# Patient Record
Sex: FEMALE | Race: WHITE | Employment: UNEMPLOYED | URBAN - METROPOLITAN AREA
[De-identification: names, ages, dates, MRNs, and addresses within clinical notes are randomized per-mention and may not be internally consistent; named-entity substitution may affect disease eponyms.]

---

## 2021-11-01 ENCOUNTER — OFFICE VISIT (OUTPATIENT)
Dept: ORTHOPEDIC SURGERY | Age: 86
End: 2021-11-01
Payer: COMMERCIAL

## 2021-11-01 VITALS
DIASTOLIC BLOOD PRESSURE: 91 MMHG | HEART RATE: 66 BPM | WEIGHT: 117 LBS | RESPIRATION RATE: 14 BRPM | BODY MASS INDEX: 23.59 KG/M2 | SYSTOLIC BLOOD PRESSURE: 157 MMHG | HEIGHT: 59 IN

## 2021-11-01 DIAGNOSIS — M65.332 TRIGGER MIDDLE FINGER OF LEFT HAND: Primary | ICD-10-CM

## 2021-11-01 DIAGNOSIS — M79.642 PAIN OF LEFT HAND: Primary | ICD-10-CM

## 2021-11-01 PROCEDURE — 20550 NJX 1 TENDON SHEATH/LIGAMENT: CPT | Performed by: PHYSICIAN ASSISTANT

## 2021-11-01 PROCEDURE — 99203 OFFICE O/P NEW LOW 30 MIN: CPT | Performed by: PHYSICIAN ASSISTANT

## 2021-11-01 RX ORDER — PRASUGREL 10 MG/1
10 TABLET, FILM COATED ORAL DAILY
COMMUNITY

## 2021-11-01 RX ORDER — METHYLPREDNISOLONE ACETATE 80 MG/ML
80 INJECTION, SUSPENSION INTRA-ARTICULAR; INTRALESIONAL; INTRAMUSCULAR; SOFT TISSUE ONCE
Status: COMPLETED | OUTPATIENT
Start: 2021-11-01 | End: 2021-11-01

## 2021-11-01 RX ORDER — LIDOCAINE HYDROCHLORIDE 10 MG/ML
1 INJECTION, SOLUTION INFILTRATION; PERINEURAL ONCE
Status: COMPLETED | OUTPATIENT
Start: 2021-11-01 | End: 2021-11-01

## 2021-11-01 RX ORDER — ATORVASTATIN CALCIUM 40 MG/1
TABLET, FILM COATED ORAL
COMMUNITY
Start: 2021-10-11

## 2021-11-01 RX ORDER — LEVOTHYROXINE SODIUM 0.07 MG/1
75 TABLET ORAL DAILY
COMMUNITY

## 2021-11-01 RX ORDER — METOPROLOL SUCCINATE 25 MG/1
25 TABLET, EXTENDED RELEASE ORAL DAILY
COMMUNITY

## 2021-11-01 RX ORDER — COVID-19 ANTIGEN TEST
220 KIT MISCELLANEOUS PRN
COMMUNITY

## 2021-11-01 RX ORDER — ASPIRIN 81 MG/1
81 TABLET ORAL DAILY
COMMUNITY

## 2021-11-01 RX ADMIN — METHYLPREDNISOLONE ACETATE 80 MG: 80 INJECTION, SUSPENSION INTRA-ARTICULAR; INTRALESIONAL; INTRAMUSCULAR; SOFT TISSUE at 10:46

## 2021-11-01 RX ADMIN — LIDOCAINE HYDROCHLORIDE 1 ML: 10 INJECTION, SOLUTION INFILTRATION; PERINEURAL at 10:45

## 2021-11-01 NOTE — PROGRESS NOTES
[facility] [address] AND SPORTS MEDICINE  [address] [place] [address]  Dept: [phone]  Dept Fax: [phone]        Left Hand & Wrist   New Patient    Subjective:     Chief Complaint   Patient presents with    New Patient     Left Hand Middle Finger Pain & Locking     HPI:     Jose Daniels presents with pain in her left middle digit. She states that on 10/24/2021 her left middle digit became stuck in a flexed position. She states it was extremely painful and was stuck that way for 4 days. She tried heat and ice with no relief. Eventually the finger became unstuck and is felt better. Today she states the pain is not terrible. She states this is happened to her in the past but it has never been stuck for this long. She has tried Tylenol extra strength and naproxen. She has seen her family doctor for this condition who referred her to orthopedics. She does have a history of arthritis in her fingers. She states that her fingers have been \" stuck this way\" for 15 years, but this pain was different. She has not had a cortisone injection. She has not had any physical therapy. She denies any numbness or tingling in her hand or fingers. ROS:     Review of Systems   Constitutional: Positive for activity change. Negative for appetite change, fatigue and fever. Respiratory: Negative. Negative for apnea, cough, chest tightness and shortness of breath. Cardiovascular: Negative. Negative for chest pain, palpitations and leg swelling. Gastrointestinal: Negative for abdominal distention, abdominal pain, constipation, diarrhea, nausea and vomiting. Genitourinary: Negative for difficulty urinating, dysuria and hematuria. Musculoskeletal: Positive for arthralgias. Negative for gait problem, joint swelling and myalgias. Skin: Negative for color change and rash. Neurological: Negative for dizziness, weakness, numbness and headaches. Attends [employer] Services:     Active Member of Clubs or Organizations:     Attends Club or Organization Meetings:     Marital Status:    Intimate Partner Violence:     Fear of Current or Ex-Partner:     Emotionally Abused:     Physically Abused:     Sexually Abused:        Family History:  No family history on file. Vitals:   BP (!) 157/91   Pulse 66   Resp 14   Ht 4' 11\" (1.499 m)   Wt 117 lb (53.1 kg)   BMI 23.63 kg/m²  Body mass index is 23.63 kg/m². Physical Examination:     Orthopedics    GENERAL: Alert and oriented X3 in no acute distress. SKIN: Visual inspection reveals no soft tissue swelling or nessa abnormality, no rotational deformity, Intact without lesions or ulcerations. NEURO: Radial, Ulnar and Median nerves are intact to sensory and motor testing. VASC: Capillary refill is less than 3 seconds, no signs of thoracic outlet syndrome, negative Jus's test.    Hand & Wrist Exam    LOCATION: Left Hand & Wrist  MUSC: Good strength is available in these motions. WRIST: No pain to palpation. No nessa pain or instability to palpation. WRIST TESTS: Negative finkelstein's, Negative Tinel's test, Negative Phalen's, Negative Stephanie Compression  ROM: Full flexor and extensor tendon function is intact. Limited range of motion of the middle, ring and little finger on the left hand. Obvious deformity of the DIP joints of the middle and ring finger. PALPATION: pain over the nodule on the ventral surface just below the [facility] joint of the middle finger. Assessment:     1. Trigger middle finger of left hand      Procedures:    Procedure: yes    TRIGGER FINGER INJECTION    Location: left Middle Finger   The stenosing portion of the flexor tendon at the A-1 pulley was identified volarly and marked distal to the pulley with the hollow end of a click type ball-point pen. The skin was prepped with a Betadine swab in a sterile fashion.   The flexor tendon sheath was injected under sterile technique with a 2 cc solution containing 1cc of 1% lidocaine  and 1 cc of 80 mg Depo-Medrol with fluid traveling up the tendon sheath. Injection site was verified by flexing and extending the digit and sensing the needle rubbing on the flexor tendon. The skin was cleansed and a Band-Aid was placed. The patient tolerated the procedure without difficulty. The patient was told to watch for signs of infection and to call immediately with any problems. Radiology:   XR HAND LEFT (MIN 3 VIEWS)    Result Date: 11/1/2021  3 views of the left hand including AP, lateral and oblique reveal degenerative changes throughout all fingers with the worst being the DIP joints of the index and middle finger. Significant carpometacarpal joint degenerative changes of the thumb noted. Calcific tendinitis of the middle and ring digit noted at the carpometacarpal joint. Impression: Moderate to severe degenerative changes of the left hand  Plan:   Treatment : I reviewed the X-ray with the patient and I informed them that the x-ray shows some calcific tendinitis of the flexor tendon of the middle digit. We discussed the etiologies and natural histories of trigger finger of the left middle finger. We discussed the various treatment alternatives including anti-inflammatory medications, physical therapy, injections, further imaging studies and as a last result surgery. During today's visit, we discussed that there are options for surgery versus conservative treatments like a cortisone injection. The patient and her daughter state that they don't want to have surgery unless absolutely necessary. I did suggest she could try Voltaren gel over-the-counter to see if that helps but the patient states she wants to have a cortisone injection to try to keep her finger from getting stuck again. I explained that it will not fix the problem but it may keep it from triggering and she would like to try that.   I did offer her to have her come back and meet  Tommy to discuss a trigger finger release if the injection doesn't work. She stated that she does all of her orthopedic treatments at [facility] but may consider meeting with Dr. Fany Nathan if she decides to have surgery. The patient has opted for a cortisone injection into the left middle trigger finger to help reduce inflammation and pain. The injection site should never get red, hot, or swollen and if it does the patient will contact our office right away. The patient may experience a increase in soreness the first 24-48 hours due to a cortisone flair and can take anti-inflammatories for a short period of time to reduce that soreness. The patient should not submerge the injection site in water for a minimum of 24 hours to avoid infection. This means no lakes, pools, ponds, or hot tubs for 24 hours. If the patient is diabetic the injection may increase their blood sugar for up to one week. The patient can do this cortisone injection once every 3 months as needed. If the injections stop working and do not give the patient relief the patient should consider surgical interventions to produce long term relief. Patient should return to the clinic as needed to follow up with  Carlos Castillo PA-C. The patient will call the office immediately with any problems. Orders Placed This Encounter   Medications    lidocaine 1 % injection 1 mL    methylPREDNISolone acetate (DEPO-MEDROL) injection 80 mg       No orders of the defined types were placed in this encounter. This note is created with the assistance of a speech recognition program.  While intending to generate a document that actually reflects the content of the visit, the document can still have some errors including those of syntax and sound a like substitutions which may escape proof reading.   In such instances, actual meaning can be extrapolated by contextual diversion      Electronically signed by Jeremy Rangel PA-C, on 11/2/2021 at 5:03 PM

## 2021-11-02 ASSESSMENT — ENCOUNTER SYMPTOMS
ABDOMINAL PAIN: 0
VOMITING: 0
SHORTNESS OF BREATH: 0
DIARRHEA: 0
CHEST TIGHTNESS: 0
NAUSEA: 0
CONSTIPATION: 0
RESPIRATORY NEGATIVE: 1
COUGH: 0
COLOR CHANGE: 0
ABDOMINAL DISTENTION: 0
APNEA: 0

## 2022-11-10 ENCOUNTER — TRANSCRIBE ORDERS (OUTPATIENT)
Dept: ADMINISTRATIVE | Age: 87
End: 2022-11-10

## 2022-11-10 DIAGNOSIS — I65.23 CAROTID STENOSIS, BILATERAL: Primary | ICD-10-CM

## 2022-12-02 ENCOUNTER — HOSPITAL ENCOUNTER (OUTPATIENT)
Dept: VASCULAR LAB | Age: 87
Discharge: HOME | End: 2022-12-02
Payer: COMMERCIAL

## 2022-12-02 DIAGNOSIS — I65.23 CAROTID STENOSIS, BILATERAL: ICD-10-CM

## 2022-12-02 PROCEDURE — 93880 EXTRACRANIAL BILAT STUDY: CPT

## 2024-04-19 ENCOUNTER — HOSPITAL ENCOUNTER (INPATIENT)
Age: 89
LOS: 3 days | Discharge: HOME | DRG: 309 | End: 2024-04-22
Attending: EMERGENCY MEDICINE | Admitting: STUDENT IN AN ORGANIZED HEALTH CARE EDUCATION/TRAINING PROGRAM
Payer: COMMERCIAL

## 2024-04-19 ENCOUNTER — APPOINTMENT (OUTPATIENT)
Dept: CT IMAGING | Age: 89
DRG: 309 | End: 2024-04-19
Payer: COMMERCIAL

## 2024-04-19 DIAGNOSIS — I48.3 TYPICAL ATRIAL FLUTTER (HCC): Primary | ICD-10-CM

## 2024-04-19 DIAGNOSIS — N30.00 ACUTE CYSTITIS WITHOUT HEMATURIA: ICD-10-CM

## 2024-04-19 DIAGNOSIS — I48.92 ATRIAL FLUTTER, UNSPECIFIED TYPE (HCC): ICD-10-CM

## 2024-04-19 LAB
ANION GAP SERPL CALCULATED.3IONS-SCNC: 13 MMOL/L (ref 9–17)
BACTERIA URNS QL MICRO: ABNORMAL
BASOPHILS # BLD: 0.01 K/UL (ref 0–0.2)
BASOPHILS NFR BLD: 0 % (ref 0–2)
BILIRUB UR QL STRIP: NEGATIVE
BNP SERPL-MCNC: 4454 PG/ML
BUN SERPL-MCNC: 41 MG/DL (ref 8–23)
CALCIUM SERPL-MCNC: 9.1 MG/DL (ref 8.6–10.4)
CASTS #/AREA URNS LPF: ABNORMAL /LPF
CASTS #/AREA URNS LPF: ABNORMAL /LPF
CHLORIDE SERPL-SCNC: 106 MMOL/L (ref 98–107)
CLARITY UR: ABNORMAL
CO2 SERPL-SCNC: 21 MMOL/L (ref 20–31)
COLOR UR: YELLOW
CREAT SERPL-MCNC: 1 MG/DL (ref 0.5–0.9)
EKG Q-T INTERVAL: 380 MS
EKG QRS DURATION: 80 MS
EKG QTC CALCULATION (BAZETT): 427 MS
EKG R AXIS: -22 DEGREES
EKG T AXIS: 20 DEGREES
EKG VENTRICULAR RATE: 76 BPM
EOSINOPHIL # BLD: 0.08 K/UL (ref 0–0.4)
EOSINOPHILS RELATIVE PERCENT: 2 % (ref 1–4)
EPI CELLS #/AREA URNS HPF: ABNORMAL /HPF (ref 0–5)
ERYTHROCYTE [DISTWIDTH] IN BLOOD BY AUTOMATED COUNT: 13.8 % (ref 12.5–15.4)
ERYTHROCYTE [DISTWIDTH] IN BLOOD BY AUTOMATED COUNT: 14.3 % (ref 12.5–15.4)
GFR SERPL CREATININE-BSD FRML MDRD: 53 ML/MIN/1.73M2
GLUCOSE SERPL-MCNC: 99 MG/DL (ref 70–99)
GLUCOSE UR STRIP-MCNC: NEGATIVE MG/DL
HCT VFR BLD AUTO: 31.5 % (ref 36–46)
HCT VFR BLD AUTO: 34.8 % (ref 36–46)
HGB BLD-MCNC: 10.7 G/DL (ref 12–16)
HGB BLD-MCNC: 11.3 G/DL (ref 12–16)
HGB UR QL STRIP.AUTO: NEGATIVE
INR PPP: 0.9
KETONES UR STRIP-MCNC: NEGATIVE MG/DL
LEUKOCYTE ESTERASE UR QL STRIP: ABNORMAL
LYMPHOCYTES NFR BLD: 1.42 K/UL (ref 1–4.8)
LYMPHOCYTES RELATIVE PERCENT: 28 % (ref 24–44)
MAGNESIUM SERPL-MCNC: 2.1 MG/DL (ref 1.6–2.6)
MCH RBC QN AUTO: 31.8 PG (ref 26–34)
MCH RBC QN AUTO: 31.9 PG (ref 26–34)
MCHC RBC AUTO-ENTMCNC: 32.5 G/DL (ref 31–37)
MCHC RBC AUTO-ENTMCNC: 34.2 G/DL (ref 31–37)
MCV RBC AUTO: 93.3 FL (ref 80–100)
MCV RBC AUTO: 98 FL (ref 80–100)
MONOCYTES NFR BLD: 0.76 K/UL (ref 0.1–1.2)
MONOCYTES NFR BLD: 15 % (ref 2–11)
MUCOUS THREADS URNS QL MICRO: ABNORMAL
NEUTROPHILS NFR BLD: 55 % (ref 36–66)
NEUTS SEG NFR BLD: 2.81 K/UL (ref 1.8–7.7)
NITRITE UR QL STRIP: NEGATIVE
PARTIAL THROMBOPLASTIN TIME: 23.3 SEC (ref 21.3–31.3)
PARTIAL THROMBOPLASTIN TIME: 23.4 SEC (ref 21.3–31.3)
PH UR STRIP: 6 [PH] (ref 5–8)
PLATELET # BLD AUTO: 253 K/UL (ref 140–450)
PLATELET # BLD AUTO: 264 K/UL (ref 140–450)
PMV BLD AUTO: 7 FL (ref 6–12)
PMV BLD AUTO: 9.3 FL (ref 8–14)
POTASSIUM SERPL-SCNC: 4 MMOL/L (ref 3.7–5.3)
PROT UR STRIP-MCNC: ABNORMAL MG/DL
PROTHROMBIN TIME: 10 SEC (ref 9.4–12.6)
RBC # BLD AUTO: 3.37 M/UL (ref 4–5.2)
RBC # BLD AUTO: 3.55 M/UL (ref 4–5.2)
RBC #/AREA URNS HPF: ABNORMAL /HPF (ref 0–2)
SODIUM SERPL-SCNC: 140 MMOL/L (ref 135–144)
SP GR UR STRIP: 1.02 (ref 1–1.03)
TROPONIN I SERPL HS-MCNC: 20 NG/L (ref 0–14)
TROPONIN I SERPL HS-MCNC: 21 NG/L (ref 0–14)
TSH SERPL DL<=0.05 MIU/L-ACNC: 3.23 UIU/ML (ref 0.3–5)
UROBILINOGEN UR STRIP-ACNC: NORMAL EU/DL (ref 0–1)
WBC #/AREA URNS HPF: ABNORMAL /HPF (ref 0–5)
WBC OTHER # BLD: 4.6 K/UL (ref 3.5–11)
WBC OTHER # BLD: 5.1 K/UL (ref 3.5–11)

## 2024-04-19 PROCEDURE — 36415 COLL VENOUS BLD VENIPUNCTURE: CPT

## 2024-04-19 PROCEDURE — 96365 THER/PROPH/DIAG IV INF INIT: CPT

## 2024-04-19 PROCEDURE — 2580000003 HC RX 258: Performed by: NURSE PRACTITIONER

## 2024-04-19 PROCEDURE — 1210000000 HC MED SURG R&B

## 2024-04-19 PROCEDURE — 85025 COMPLETE CBC W/AUTO DIFF WBC: CPT

## 2024-04-19 PROCEDURE — 83735 ASSAY OF MAGNESIUM: CPT

## 2024-04-19 PROCEDURE — 85027 COMPLETE CBC AUTOMATED: CPT

## 2024-04-19 PROCEDURE — 93005 ELECTROCARDIOGRAM TRACING: CPT | Performed by: EMERGENCY MEDICINE

## 2024-04-19 PROCEDURE — 87077 CULTURE AEROBIC IDENTIFY: CPT

## 2024-04-19 PROCEDURE — 6360000002 HC RX W HCPCS: Performed by: EMERGENCY MEDICINE

## 2024-04-19 PROCEDURE — 87088 URINE BACTERIA CULTURE: CPT

## 2024-04-19 PROCEDURE — 99285 EMERGENCY DEPT VISIT HI MDM: CPT

## 2024-04-19 PROCEDURE — 81001 URINALYSIS AUTO W/SCOPE: CPT

## 2024-04-19 PROCEDURE — 6360000004 HC RX CONTRAST MEDICATION: Performed by: EMERGENCY MEDICINE

## 2024-04-19 PROCEDURE — 87186 SC STD MICRODIL/AGAR DIL: CPT

## 2024-04-19 PROCEDURE — 83880 ASSAY OF NATRIURETIC PEPTIDE: CPT

## 2024-04-19 PROCEDURE — 6370000000 HC RX 637 (ALT 250 FOR IP): Performed by: NURSE PRACTITIONER

## 2024-04-19 PROCEDURE — 2580000003 HC RX 258: Performed by: EMERGENCY MEDICINE

## 2024-04-19 PROCEDURE — 85730 THROMBOPLASTIN TIME PARTIAL: CPT

## 2024-04-19 PROCEDURE — 85610 PROTHROMBIN TIME: CPT

## 2024-04-19 PROCEDURE — 84484 ASSAY OF TROPONIN QUANT: CPT

## 2024-04-19 PROCEDURE — 87086 URINE CULTURE/COLONY COUNT: CPT

## 2024-04-19 PROCEDURE — 71260 CT THORAX DX C+: CPT

## 2024-04-19 PROCEDURE — 80048 BASIC METABOLIC PNL TOTAL CA: CPT

## 2024-04-19 PROCEDURE — 84443 ASSAY THYROID STIM HORMONE: CPT

## 2024-04-19 PROCEDURE — 6360000002 HC RX W HCPCS: Performed by: NURSE PRACTITIONER

## 2024-04-19 RX ORDER — ENOXAPARIN SODIUM 100 MG/ML
30 INJECTION SUBCUTANEOUS DAILY
Status: CANCELLED | OUTPATIENT
Start: 2024-04-19

## 2024-04-19 RX ORDER — HEPARIN SODIUM 1000 [USP'U]/ML
30 INJECTION, SOLUTION INTRAVENOUS; SUBCUTANEOUS PRN
Status: DISCONTINUED | OUTPATIENT
Start: 2024-04-19 | End: 2024-04-22 | Stop reason: HOSPADM

## 2024-04-19 RX ORDER — ACETAMINOPHEN 325 MG/1
650 TABLET ORAL EVERY 6 HOURS PRN
Status: DISCONTINUED | OUTPATIENT
Start: 2024-04-19 | End: 2024-04-22 | Stop reason: HOSPADM

## 2024-04-19 RX ORDER — ATORVASTATIN CALCIUM 40 MG/1
40 TABLET, FILM COATED ORAL DAILY
Status: DISCONTINUED | OUTPATIENT
Start: 2024-04-19 | End: 2024-04-22 | Stop reason: HOSPADM

## 2024-04-19 RX ORDER — ACETAMINOPHEN 650 MG/1
650 SUPPOSITORY RECTAL EVERY 6 HOURS PRN
Status: DISCONTINUED | OUTPATIENT
Start: 2024-04-19 | End: 2024-04-22 | Stop reason: HOSPADM

## 2024-04-19 RX ORDER — ONDANSETRON 2 MG/ML
4 INJECTION INTRAMUSCULAR; INTRAVENOUS EVERY 6 HOURS PRN
Status: DISCONTINUED | OUTPATIENT
Start: 2024-04-19 | End: 2024-04-22 | Stop reason: HOSPADM

## 2024-04-19 RX ORDER — HEPARIN SODIUM 1000 [USP'U]/ML
60 INJECTION, SOLUTION INTRAVENOUS; SUBCUTANEOUS PRN
Status: DISCONTINUED | OUTPATIENT
Start: 2024-04-19 | End: 2024-04-22 | Stop reason: HOSPADM

## 2024-04-19 RX ORDER — HEPARIN SODIUM 1000 [USP'U]/ML
60 INJECTION, SOLUTION INTRAVENOUS; SUBCUTANEOUS ONCE
Status: COMPLETED | OUTPATIENT
Start: 2024-04-19 | End: 2024-04-19

## 2024-04-19 RX ORDER — SODIUM CHLORIDE 0.9 % (FLUSH) 0.9 %
5-40 SYRINGE (ML) INJECTION EVERY 12 HOURS SCHEDULED
Status: DISCONTINUED | OUTPATIENT
Start: 2024-04-19 | End: 2024-04-22 | Stop reason: HOSPADM

## 2024-04-19 RX ORDER — LEVOTHYROXINE SODIUM 0.07 MG/1
75 TABLET ORAL DAILY
Status: DISCONTINUED | OUTPATIENT
Start: 2024-04-19 | End: 2024-04-22 | Stop reason: HOSPADM

## 2024-04-19 RX ORDER — HEPARIN SODIUM 10000 [USP'U]/100ML
5-30 INJECTION, SOLUTION INTRAVENOUS CONTINUOUS
Status: DISCONTINUED | OUTPATIENT
Start: 2024-04-19 | End: 2024-04-22

## 2024-04-19 RX ORDER — 0.9 % SODIUM CHLORIDE 0.9 %
80 INTRAVENOUS SOLUTION INTRAVENOUS ONCE
Status: COMPLETED | OUTPATIENT
Start: 2024-04-19 | End: 2024-04-19

## 2024-04-19 RX ORDER — ENOXAPARIN SODIUM 100 MG/ML
1 INJECTION SUBCUTANEOUS 2 TIMES DAILY
Status: DISCONTINUED | OUTPATIENT
Start: 2024-04-19 | End: 2024-04-19

## 2024-04-19 RX ORDER — SODIUM CHLORIDE 0.9 % (FLUSH) 0.9 %
10 SYRINGE (ML) INJECTION PRN
Status: DISCONTINUED | OUTPATIENT
Start: 2024-04-19 | End: 2024-04-22 | Stop reason: HOSPADM

## 2024-04-19 RX ORDER — PRASUGREL 10 MG/1
10 TABLET, FILM COATED ORAL DAILY
Status: DISCONTINUED | OUTPATIENT
Start: 2024-04-19 | End: 2024-04-20

## 2024-04-19 RX ORDER — ASPIRIN 81 MG/1
81 TABLET ORAL DAILY
Status: DISCONTINUED | OUTPATIENT
Start: 2024-04-19 | End: 2024-04-22 | Stop reason: HOSPADM

## 2024-04-19 RX ORDER — ONDANSETRON 4 MG/1
4 TABLET, ORALLY DISINTEGRATING ORAL EVERY 8 HOURS PRN
Status: DISCONTINUED | OUTPATIENT
Start: 2024-04-19 | End: 2024-04-22 | Stop reason: HOSPADM

## 2024-04-19 RX ORDER — SODIUM CHLORIDE 9 MG/ML
INJECTION, SOLUTION INTRAVENOUS PRN
Status: DISCONTINUED | OUTPATIENT
Start: 2024-04-19 | End: 2024-04-22 | Stop reason: HOSPADM

## 2024-04-19 RX ORDER — POLYETHYLENE GLYCOL 3350 17 G/17G
17 POWDER, FOR SOLUTION ORAL DAILY PRN
Status: DISCONTINUED | OUTPATIENT
Start: 2024-04-19 | End: 2024-04-22 | Stop reason: HOSPADM

## 2024-04-19 RX ORDER — METOPROLOL SUCCINATE 25 MG/1
25 TABLET, EXTENDED RELEASE ORAL DAILY
Status: DISCONTINUED | OUTPATIENT
Start: 2024-04-19 | End: 2024-04-22 | Stop reason: HOSPADM

## 2024-04-19 RX ADMIN — ACETAMINOPHEN 650 MG: 325 TABLET ORAL at 22:51

## 2024-04-19 RX ADMIN — SODIUM CHLORIDE, PRESERVATIVE FREE 10 ML: 5 INJECTION INTRAVENOUS at 22:39

## 2024-04-19 RX ADMIN — HEPARIN SODIUM 12 UNITS/KG/HR: 10000 INJECTION, SOLUTION INTRAVENOUS at 22:50

## 2024-04-19 RX ADMIN — SODIUM CHLORIDE 80 ML: 9 INJECTION, SOLUTION INTRAVENOUS at 18:07

## 2024-04-19 RX ADMIN — HEPARIN SODIUM 2900 UNITS: 1000 INJECTION INTRAVENOUS; SUBCUTANEOUS at 22:48

## 2024-04-19 RX ADMIN — IOPAMIDOL 75 ML: 755 INJECTION, SOLUTION INTRAVENOUS at 18:07

## 2024-04-19 RX ADMIN — CEFTRIAXONE SODIUM 1000 MG: 1 INJECTION, POWDER, FOR SOLUTION INTRAMUSCULAR; INTRAVENOUS at 17:56

## 2024-04-19 RX ADMIN — SODIUM CHLORIDE, PRESERVATIVE FREE 10 ML: 5 INJECTION INTRAVENOUS at 18:07

## 2024-04-19 ASSESSMENT — LIFESTYLE VARIABLES
HOW MANY STANDARD DRINKS CONTAINING ALCOHOL DO YOU HAVE ON A TYPICAL DAY: PATIENT DOES NOT DRINK
HOW OFTEN DO YOU HAVE A DRINK CONTAINING ALCOHOL: NEVER

## 2024-04-19 ASSESSMENT — PAIN SCALES - GENERAL
PAINLEVEL_OUTOF10: 3
PAINLEVEL_OUTOF10: 1

## 2024-04-19 ASSESSMENT — PAIN - FUNCTIONAL ASSESSMENT: PAIN_FUNCTIONAL_ASSESSMENT: NONE - DENIES PAIN

## 2024-04-19 NOTE — ED PROVIDER NOTES
[place] [id number]  Phone: [phone]       Pt Name: Marissa Campo  MRN: [id number]  Birthdate 1/17/1933  Date of evaluation: 4/19/24  PCP:  Liyah Hartman MD    CHIEF COMPLAINT       Chief Complaint   Patient presents with    Anxiety    Shortness of Breath     Pt arrives with co anxiety. Pt is here with DIL who states they have been battling anxiety for approx 1 mth. Pt has tried zoloft and is now going to try a new medication. Pt states she feels like she is having trouble getting a deep breath       HISTORY OF PRESENT ILLNESS  (Location/Symptom, Timing/Onset, Context/Setting, Quality, Duration, Modifying Factors, Severity.)    Marissa Campo is a 89 y.o. female who presents emergency department with a total of 3 weeks of what was initially deemed as shortness of breath secondary to anxiety, however this progressively worsened over the past 24 hours so she was brought in for further evaluation.  She does have a history of high cholesterol hypertension as well as coronary artery disease and follows with [facility] cardiology.  No recent changes in her cardiac medications, however they recently have started her on antianxiety/antidepressant medications.    PAST MEDICAL / SURGICAL / SOCIAL / FAMILY HISTORY    has a past medical history of Arthritis, CAD (coronary artery disease), Hyperlipidemia, Hypertension, and Hypothyroidism.     has no past surgical history on file.    Social History     Socioeconomic History    Marital status:      Spouse name: Not on file    Number of children: Not on file    Years of education: Not on file    Highest education level: Not on file   Occupational History    Not on file   Tobacco Use    Smoking status: Never    Smokeless tobacco: Never   Substance and Sexual Activity    Alcohol use: Never    Drug use: Not on file    Sexual activity: Never   Other Topics Concern    Not on file   Social History Narrative    Not on

## 2024-04-20 PROBLEM — I10 PRIMARY HYPERTENSION: Status: ACTIVE | Noted: 2024-04-20

## 2024-04-20 PROBLEM — N30.00 ACUTE CYSTITIS WITHOUT HEMATURIA: Status: ACTIVE | Noted: 2024-04-20

## 2024-04-20 LAB
ANION GAP SERPL CALCULATED.3IONS-SCNC: 9 MMOL/L (ref 9–17)
BNP SERPL-MCNC: 2459 PG/ML
BUN SERPL-MCNC: 31 MG/DL (ref 8–23)
CALCIUM SERPL-MCNC: 9 MG/DL (ref 8.6–10.4)
CHLORIDE SERPL-SCNC: 106 MMOL/L (ref 98–107)
CO2 SERPL-SCNC: 23 MMOL/L (ref 20–31)
CREAT SERPL-MCNC: 0.9 MG/DL (ref 0.5–0.9)
GFR SERPL CREATININE-BSD FRML MDRD: 60 ML/MIN/1.73M2
GLUCOSE SERPL-MCNC: 95 MG/DL (ref 70–99)
INR PPP: 0.9
MAGNESIUM SERPL-MCNC: 2.1 MG/DL (ref 1.6–2.6)
PARTIAL THROMBOPLASTIN TIME: 42.8 SEC (ref 21.3–31.3)
PARTIAL THROMBOPLASTIN TIME: 44.2 SEC (ref 21.3–31.3)
PARTIAL THROMBOPLASTIN TIME: 50.1 SEC (ref 21.3–31.3)
POTASSIUM SERPL-SCNC: 3.6 MMOL/L (ref 3.7–5.3)
PROTHROMBIN TIME: 10 SEC (ref 9.4–12.6)
SODIUM SERPL-SCNC: 138 MMOL/L (ref 135–144)

## 2024-04-20 PROCEDURE — 6360000002 HC RX W HCPCS: Performed by: NURSE PRACTITIONER

## 2024-04-20 PROCEDURE — 6370000000 HC RX 637 (ALT 250 FOR IP): Performed by: NURSE PRACTITIONER

## 2024-04-20 PROCEDURE — 85610 PROTHROMBIN TIME: CPT

## 2024-04-20 PROCEDURE — 1210000000 HC MED SURG R&B

## 2024-04-20 PROCEDURE — 36415 COLL VENOUS BLD VENIPUNCTURE: CPT

## 2024-04-20 PROCEDURE — 99223 1ST HOSP IP/OBS HIGH 75: CPT | Performed by: INTERNAL MEDICINE

## 2024-04-20 PROCEDURE — 2580000003 HC RX 258: Performed by: NURSE PRACTITIONER

## 2024-04-20 PROCEDURE — 83880 ASSAY OF NATRIURETIC PEPTIDE: CPT

## 2024-04-20 PROCEDURE — 99223 1ST HOSP IP/OBS HIGH 75: CPT | Performed by: STUDENT IN AN ORGANIZED HEALTH CARE EDUCATION/TRAINING PROGRAM

## 2024-04-20 PROCEDURE — 83735 ASSAY OF MAGNESIUM: CPT

## 2024-04-20 PROCEDURE — 85730 THROMBOPLASTIN TIME PARTIAL: CPT

## 2024-04-20 PROCEDURE — 80048 BASIC METABOLIC PNL TOTAL CA: CPT

## 2024-04-20 RX ADMIN — CEFTRIAXONE SODIUM 1000 MG: 1 INJECTION, POWDER, FOR SOLUTION INTRAMUSCULAR; INTRAVENOUS at 16:56

## 2024-04-20 RX ADMIN — HEPARIN SODIUM 1400 UNITS: 1000 INJECTION INTRAVENOUS; SUBCUTANEOUS at 19:42

## 2024-04-20 RX ADMIN — METOPROLOL SUCCINATE 25 MG: 25 TABLET, EXTENDED RELEASE ORAL at 08:47

## 2024-04-20 RX ADMIN — SODIUM CHLORIDE, PRESERVATIVE FREE 10 ML: 5 INJECTION INTRAVENOUS at 08:48

## 2024-04-20 RX ADMIN — ATORVASTATIN CALCIUM 40 MG: 40 TABLET, FILM COATED ORAL at 08:46

## 2024-04-20 RX ADMIN — ASPIRIN 81 MG: 81 TABLET, COATED ORAL at 08:46

## 2024-04-20 RX ADMIN — HEPARIN SODIUM 1400 UNITS: 1000 INJECTION INTRAVENOUS; SUBCUTANEOUS at 05:40

## 2024-04-20 RX ADMIN — LEVOTHYROXINE SODIUM 75 MCG: 75 TABLET ORAL at 08:46

## 2024-04-20 ASSESSMENT — PAIN SCALES - GENERAL
PAINLEVEL_OUTOF10: 0

## 2024-04-20 NOTE — CONSULTS
excursion and expansion without use of accessory muscles  Resp Auscultation: Good respiratory effort. No for increased work of breathing. On auscultation: clear  Cardiovascular:  Heart tones are crisp and normal. regular S1 and S2. Murmurs: 4/6 sys mur  Jugular venous pulsation Normal  Abdomen:  No masses or tenderness  Bowel sounds present  Extremities:   No Cyanosis or Clubbing   Lower extremity edema: none   Skin: Warm and dry  Neurological:  Alert and oriented.  Moves all extremities well  No abnormalities of mood, affect, memory, mentation, or behavior are noted    DATA:    Diagnostics:      EKG:   Results for orders placed or performed during the hospital encounter of 04/19/24   EKG 12 Lead   Result Value Ref Range    Ventricular Rate 76 BPM    QRS Duration 80 ms    Q-T Interval 380 ms    QTc Calculation (Bazett) 427 ms    R Axis -22 degrees    T Axis 20 degrees    Narrative    Atrial fibrillation  Anterior infarct , age undetermined  Abnormal ECG  When compared with ECG of 17-OCT-1997 23:01,  Atrial fibrillation has replaced Sinus rhythm     Cath 2018:   · Severe double vessel disease   · Mildly elevated LVEDP   · Known normal LV systolic function with echo     Recommendations:   - Aggressive medical therapy   - Aggressive risk factor modifications   - Ok for CEA with moderate risk     Coronary Findings Diagnostic Dominance: Co-dominant   Left Main: The vessel was visualized by angiography, is moderate in size and is angiographically normal.   Left Anterior Descending: Prox LAD lesion, 30% stenosed. Dist LAD lesion, 20% stenosed.   Left Circumflex: Dist Cx filled by collaterals from 1st RPL. Prox Cx lesion, 99% stenosed. First Obtuse Marginal Branch: Ost 1st Mrg filled by collaterals from 1st Sept.   Right Coronary Artery: The vessel is moderate in size. Ost RCA lesion, 80% stenosed.     Intervention   No interventions have been documented.       Labs:     CBC:   Recent Labs     04/19/24  1653 04/19/24  2234

## 2024-04-20 NOTE — PLAN OF CARE
Problem: Discharge Planning  Goal: Discharge to home or other facility with appropriate resources  Outcome: Progressing  Flowsheets (Taken 4/19/2024 2100)  Discharge to home or other facility with appropriate resources:   Identify barriers to discharge with patient and caregiver   Arrange for needed discharge resources and transportation as appropriate   Identify discharge learning needs (meds, wound care, etc)     Problem: Safety - Adult  Goal: Free from fall injury  Outcome: Progressing  Flowsheets (Taken 4/20/2024 0431)  Free From Fall Injury: Instruct family/caregiver on patient safety     Problem: ABCDS Injury Assessment  Goal: Absence of physical injury  Outcome: Progressing  Flowsheets (Taken 4/20/2024 0431)  Absence of Physical Injury: Implement safety measures based on patient assessment

## 2024-04-20 NOTE — PLAN OF CARE
Problem: Safety - Adult  Goal: Free from fall injury  4/20/2024 1811 by Judd Mccain RN  Outcome: Progressing  Flowsheets (Taken 4/20/2024 0750)  Free From Fall Injury: Instruct family/caregiver on patient safety     Problem: ABCDS Injury Assessment  Goal: Absence of physical injury  4/20/2024 1811 by Judd Mccain RN  Outcome: Progressing     Problem: Discharge Planning  Goal: Discharge to home or other facility with appropriate resources  4/20/2024 1811 by Judd Mccain RN  Outcome: Progressing  Flowsheets (Taken 4/20/2024 0745)  Discharge to home or other facility with appropriate resources:   Identify barriers to discharge with patient and caregiver   Arrange for needed discharge resources and transportation as appropriate   Identify discharge learning needs (meds, wound care, etc)

## 2024-04-20 NOTE — H&P
[facility]  Office: [phone]  Norris Galvan DO, Anant Gallo DO, Jose Butts DO, Bruno García DO, Juan J Gonzales MD, Elham Rodrigues MD, Cornell Ambrose MD, Anna Montiel MD,  David Vidal MD, Eve Bynum MD, Lesley Love MD,  Adia Bartlett DO, Lalito De Leon MD, Pato Mccoy MD, Gaurang Galvan DO, Tamy Ardon MD,  Petros Dodd DO, Leia Moyer MD, Jackie Beltrán MD, Elysia Goodman MD, Nikhil Mcguire MD,  Rodney Knight MD, Cassidy Guadarrama MD, Rigoberto Duran MD, Pily Kent MD, Kade Douglass MD, Clark Olsen MD, Ezequiel Riley DO, Polo Bui DO, Ronni Nobles MD,  Benson Dasilva MD, Shirley Waterhouse, CNP,  Dyana Luke CNP, Sin Miguel, CNP,  Liliana Jones, DNP, Mesha Ibrahim, CNP, Marjorie Portillo, CNP, Pam Adhikari, CNP, Lina James, CNP, Celina Ahuja, PA-C, Tomasa Varela PA-C, Rita Barrientos, CNP, Caren Garcia, CNP, Cindy Cox, CNP, Obdulia Green, CNP, Erin Flanagan, CNP, Preethi Monterroso, CNS, Calista Corral, CNP, Yesenia Farmer CNP, Tracy Schwab, CNP         [facility]   IN-PATIENT SERVICE   [facility]    HISTORY AND PHYSICAL EXAMINATION            Date:   4/20/2024  Patient name:  Marissa Campo  Date of admission:  4/19/2024  3:41 PM  MRN:   [id number]  Account:  [id number]  YOB: 1933  PCP:    Liyah Hartman MD  Room:   [address] [place] [address]  Code Status:    Full Code    Chief Complaint:     Chief Complaint   Patient presents with    Anxiety    Shortness of Breath     Pt arrives with co anxiety. Pt is here with DIL who states they have been battling anxiety for approx 1 mth. Pt has tried zoloft and is now going to try a new medication. Pt states she feels like she is having trouble getting a deep breath     History Obtained From:     patient, electronic medical record    History of Present Illness:     Marissa Campo is a 89 y.o. female with PMHx of HLD, HTN and CAD who follows with [facility]

## 2024-04-21 LAB
ANION GAP SERPL CALCULATED.3IONS-SCNC: 9 MMOL/L (ref 9–17)
BUN SERPL-MCNC: 17 MG/DL (ref 8–23)
CALCIUM SERPL-MCNC: 9.3 MG/DL (ref 8.6–10.4)
CHLORIDE SERPL-SCNC: 106 MMOL/L (ref 98–107)
CO2 SERPL-SCNC: 26 MMOL/L (ref 20–31)
CREAT SERPL-MCNC: 0.8 MG/DL (ref 0.5–0.9)
ERYTHROCYTE [DISTWIDTH] IN BLOOD BY AUTOMATED COUNT: 13.7 % (ref 12.5–15.4)
GFR SERPL CREATININE-BSD FRML MDRD: 70 ML/MIN/1.73M2
GLUCOSE SERPL-MCNC: 89 MG/DL (ref 70–99)
HCT VFR BLD AUTO: 38.1 % (ref 36–46)
HGB BLD-MCNC: 12.2 G/DL (ref 12–16)
MAGNESIUM SERPL-MCNC: 2.1 MG/DL (ref 1.6–2.6)
MCH RBC QN AUTO: 31.7 PG (ref 26–34)
MCHC RBC AUTO-ENTMCNC: 32 G/DL (ref 31–37)
MCV RBC AUTO: 99 FL (ref 80–100)
PARTIAL THROMBOPLASTIN TIME: 61.4 SEC (ref 21.3–31.3)
PARTIAL THROMBOPLASTIN TIME: 66.4 SEC (ref 21.3–31.3)
PLATELET # BLD AUTO: 257 K/UL (ref 140–450)
PMV BLD AUTO: 9.3 FL (ref 8–14)
POTASSIUM SERPL-SCNC: 3.8 MMOL/L (ref 3.7–5.3)
RBC # BLD AUTO: 3.85 M/UL (ref 4–5.2)
SODIUM SERPL-SCNC: 141 MMOL/L (ref 135–144)
WBC OTHER # BLD: 4.3 K/UL (ref 3.5–11)

## 2024-04-21 PROCEDURE — 83735 ASSAY OF MAGNESIUM: CPT

## 2024-04-21 PROCEDURE — 99232 SBSQ HOSP IP/OBS MODERATE 35: CPT | Performed by: STUDENT IN AN ORGANIZED HEALTH CARE EDUCATION/TRAINING PROGRAM

## 2024-04-21 PROCEDURE — 99233 SBSQ HOSP IP/OBS HIGH 50: CPT | Performed by: INTERNAL MEDICINE

## 2024-04-21 PROCEDURE — 80048 BASIC METABOLIC PNL TOTAL CA: CPT

## 2024-04-21 PROCEDURE — 2580000003 HC RX 258: Performed by: NURSE PRACTITIONER

## 2024-04-21 PROCEDURE — 1210000000 HC MED SURG R&B

## 2024-04-21 PROCEDURE — 85027 COMPLETE CBC AUTOMATED: CPT

## 2024-04-21 PROCEDURE — 6360000002 HC RX W HCPCS: Performed by: NURSE PRACTITIONER

## 2024-04-21 PROCEDURE — 36415 COLL VENOUS BLD VENIPUNCTURE: CPT

## 2024-04-21 PROCEDURE — 85730 THROMBOPLASTIN TIME PARTIAL: CPT

## 2024-04-21 PROCEDURE — 6370000000 HC RX 637 (ALT 250 FOR IP): Performed by: NURSE PRACTITIONER

## 2024-04-21 RX ADMIN — HEPARIN SODIUM 16 UNITS/KG/HR: 10000 INJECTION, SOLUTION INTRAVENOUS at 08:43

## 2024-04-21 RX ADMIN — LEVOTHYROXINE SODIUM 75 MCG: 75 TABLET ORAL at 08:18

## 2024-04-21 RX ADMIN — ASPIRIN 81 MG: 81 TABLET, COATED ORAL at 08:18

## 2024-04-21 RX ADMIN — METOPROLOL SUCCINATE 25 MG: 25 TABLET, EXTENDED RELEASE ORAL at 08:18

## 2024-04-21 RX ADMIN — CEFTRIAXONE SODIUM 1000 MG: 1 INJECTION, POWDER, FOR SOLUTION INTRAMUSCULAR; INTRAVENOUS at 17:41

## 2024-04-21 RX ADMIN — ATORVASTATIN CALCIUM 40 MG: 40 TABLET, FILM COATED ORAL at 08:18

## 2024-04-21 NOTE — PLAN OF CARE
Problem: Discharge Planning  Goal: Discharge to home or other facility with appropriate resources  4/21/2024 0424 by Juan Ibarra, RN  Outcome: Progressing  Flowsheets (Taken 4/20/2024 2010)  Discharge to home or other facility with appropriate resources:   Identify barriers to discharge with patient and caregiver   Arrange for needed discharge resources and transportation as appropriate   Identify discharge learning needs (meds, wound care, etc)     Problem: Safety - Adult  Goal: Free from fall injury  4/21/2024 0424 by Juan Ibarra, RN  Outcome: Progressing  Flowsheets (Taken 4/21/2024 0423)  Free From Fall Injury: Instruct family/caregiver on patient safety     Problem: ABCDS Injury Assessment  Goal: Absence of physical injury  4/21/2024 0424 by Juan Ibarra, RN  Outcome: Progressing  Flowsheets (Taken 4/21/2024 0423)  Absence of Physical Injury: Implement safety measures based on patient assessment

## 2024-04-22 ENCOUNTER — APPOINTMENT (OUTPATIENT)
Age: 89
DRG: 309 | End: 2024-04-22
Attending: INTERNAL MEDICINE
Payer: COMMERCIAL

## 2024-04-22 VITALS
SYSTOLIC BLOOD PRESSURE: 111 MMHG | WEIGHT: 106 LBS | BODY MASS INDEX: 21.37 KG/M2 | HEART RATE: 75 BPM | HEIGHT: 59 IN | RESPIRATION RATE: 16 BRPM | TEMPERATURE: 98.6 F | OXYGEN SATURATION: 96 % | DIASTOLIC BLOOD PRESSURE: 62 MMHG

## 2024-04-22 LAB
ANION GAP SERPL CALCULATED.3IONS-SCNC: 8 MMOL/L (ref 9–17)
BUN SERPL-MCNC: 22 MG/DL (ref 8–23)
CALCIUM SERPL-MCNC: 8.9 MG/DL (ref 8.6–10.4)
CHLORIDE SERPL-SCNC: 105 MMOL/L (ref 98–107)
CO2 SERPL-SCNC: 26 MMOL/L (ref 20–31)
CREAT SERPL-MCNC: 0.7 MG/DL (ref 0.5–0.9)
ECHO AO ASC DIAM: 4.5 CM
ECHO AO ASCENDING AORTA INDEX: 3.19 CM/M2
ECHO AO ROOT DIAM: 2.7 CM
ECHO AO ROOT INDEX: 1.91 CM/M2
ECHO AR MAX VEL PISA: 4.2 M/S
ECHO AV AREA PEAK VELOCITY: 1.7 CM2
ECHO AV AREA VTI: 1.7 CM2
ECHO AV AREA/BSA PEAK VELOCITY: 1.2 CM2/M2
ECHO AV AREA/BSA VTI: 1.2 CM2/M2
ECHO AV MEAN GRADIENT: 13 MMHG
ECHO AV MEAN VELOCITY: 1.7 M/S
ECHO AV PEAK GRADIENT: 20 MMHG
ECHO AV PEAK VELOCITY: 2.3 M/S
ECHO AV REGURGITANT PHT: 380 MS
ECHO AV VELOCITY RATIO: 0.48
ECHO AV VTI: 55.5 CM
ECHO BSA: 1.41 M2
ECHO EST RA PRESSURE: 3 MMHG
ECHO IVC EXP: 0.8 CM
ECHO IVC INSP: 0.4 CM
ECHO LA AREA 2C: 26.6 CM2
ECHO LA AREA 4C: 25 CM2
ECHO LA DIAMETER INDEX: 3.76 CM/M2
ECHO LA DIAMETER: 5.3 CM
ECHO LA MAJOR AXIS: 6.5 CM
ECHO LA MINOR AXIS: 6 CM
ECHO LA TO AORTIC ROOT RATIO: 1.96
ECHO LA VOL BP: 89 ML (ref 22–52)
ECHO LA VOL MOD A2C: 97 ML (ref 22–52)
ECHO LA VOL MOD A4C: 75 ML (ref 22–52)
ECHO LA VOL/BSA BIPLANE: 63 ML/M2 (ref 16–34)
ECHO LA VOLUME INDEX MOD A2C: 69 ML/M2 (ref 16–34)
ECHO LA VOLUME INDEX MOD A4C: 53 ML/M2 (ref 16–34)
ECHO LV E' LATERAL VELOCITY: 8 CM/S
ECHO LV E' SEPTAL VELOCITY: 4 CM/S
ECHO LV FRACTIONAL SHORTENING: 20 % (ref 28–44)
ECHO LV INTERNAL DIMENSION DIASTOLE INDEX: 2.84 CM/M2
ECHO LV INTERNAL DIMENSION DIASTOLIC: 4 CM (ref 3.9–5.3)
ECHO LV INTERNAL DIMENSION SYSTOLIC INDEX: 2.27 CM/M2
ECHO LV INTERNAL DIMENSION SYSTOLIC: 3.2 CM
ECHO LV IVSD: 0.9 CM (ref 0.6–0.9)
ECHO LV MASS 2D: 109.7 G (ref 67–162)
ECHO LV MASS INDEX 2D: 77.8 G/M2 (ref 43–95)
ECHO LV POSTERIOR WALL DIASTOLIC: 0.9 CM (ref 0.6–0.9)
ECHO LV RELATIVE WALL THICKNESS RATIO: 0.45
ECHO LVOT AREA: 3.5 CM2
ECHO LVOT AV VTI INDEX: 0.5
ECHO LVOT DIAM: 2.1 CM
ECHO LVOT MEAN GRADIENT: 3 MMHG
ECHO LVOT PEAK GRADIENT: 5 MMHG
ECHO LVOT PEAK VELOCITY: 1.1 M/S
ECHO LVOT STROKE VOLUME INDEX: 67.5 ML/M2
ECHO LVOT SV: 95.2 ML
ECHO LVOT VTI: 27.5 CM
ECHO MV A VELOCITY: 0.72 M/S
ECHO MV E DECELERATION TIME (DT): 187 MS
ECHO MV E VELOCITY: 0.91 M/S
ECHO MV E/A RATIO: 1.26
ECHO MV E/E' LATERAL: 11.38
ECHO MV E/E' RATIO (AVERAGED): 17.06
ECHO RIGHT VENTRICULAR SYSTOLIC PRESSURE (RVSP): 32 MMHG
ECHO RV BASAL DIMENSION: 3.6 CM
ECHO RV FREE WALL PEAK S': 14 CM/S
ECHO TV REGURGITANT MAX VELOCITY: 2.68 M/S
ECHO TV REGURGITANT PEAK GRADIENT: 29 MMHG
GFR SERPL CREATININE-BSD FRML MDRD: 82 ML/MIN/1.73M2
GLUCOSE SERPL-MCNC: 99 MG/DL (ref 70–99)
MAGNESIUM SERPL-MCNC: 2 MG/DL (ref 1.6–2.6)
PARTIAL THROMBOPLASTIN TIME: 56.8 SEC (ref 21.3–31.3)
POTASSIUM SERPL-SCNC: 3.8 MMOL/L (ref 3.7–5.3)
SODIUM SERPL-SCNC: 139 MMOL/L (ref 135–144)

## 2024-04-22 PROCEDURE — 93306 TTE W/DOPPLER COMPLETE: CPT

## 2024-04-22 PROCEDURE — 2580000003 HC RX 258: Performed by: NURSE PRACTITIONER

## 2024-04-22 PROCEDURE — 36415 COLL VENOUS BLD VENIPUNCTURE: CPT

## 2024-04-22 PROCEDURE — 99233 SBSQ HOSP IP/OBS HIGH 50: CPT | Performed by: INTERNAL MEDICINE

## 2024-04-22 PROCEDURE — 99239 HOSP IP/OBS DSCHRG MGMT >30: CPT | Performed by: STUDENT IN AN ORGANIZED HEALTH CARE EDUCATION/TRAINING PROGRAM

## 2024-04-22 PROCEDURE — 83735 ASSAY OF MAGNESIUM: CPT

## 2024-04-22 PROCEDURE — 93306 TTE W/DOPPLER COMPLETE: CPT | Performed by: INTERNAL MEDICINE

## 2024-04-22 PROCEDURE — 6370000000 HC RX 637 (ALT 250 FOR IP): Performed by: STUDENT IN AN ORGANIZED HEALTH CARE EDUCATION/TRAINING PROGRAM

## 2024-04-22 PROCEDURE — 85730 THROMBOPLASTIN TIME PARTIAL: CPT

## 2024-04-22 PROCEDURE — 6370000000 HC RX 637 (ALT 250 FOR IP): Performed by: NURSE PRACTITIONER

## 2024-04-22 PROCEDURE — 80048 BASIC METABOLIC PNL TOTAL CA: CPT

## 2024-04-22 RX ORDER — LEVOTHYROXINE SODIUM 0.07 MG/1
75 TABLET ORAL DAILY
Qty: 30 TABLET | Refills: 3 | Status: SHIPPED | OUTPATIENT
Start: 2024-04-23

## 2024-04-22 RX ADMIN — SODIUM CHLORIDE, PRESERVATIVE FREE 10 ML: 5 INJECTION INTRAVENOUS at 08:59

## 2024-04-22 RX ADMIN — METOPROLOL SUCCINATE 25 MG: 25 TABLET, EXTENDED RELEASE ORAL at 08:59

## 2024-04-22 RX ADMIN — APIXABAN 5 MG: 5 TABLET, FILM COATED ORAL at 14:15

## 2024-04-22 RX ADMIN — ASPIRIN 81 MG: 81 TABLET, COATED ORAL at 08:59

## 2024-04-22 RX ADMIN — ATORVASTATIN CALCIUM 40 MG: 40 TABLET, FILM COATED ORAL at 08:59

## 2024-04-22 RX ADMIN — LEVOTHYROXINE SODIUM 75 MCG: 75 TABLET ORAL at 08:59

## 2024-04-22 NOTE — DISCHARGE SUMMARY
medications were sent to [facility] [id number] - [place] - [address] - P [phone] - F [phone]  [address], [place] [address]      Phone: [phone]   apixaban 5 MG Tabs tablet  levothyroxine 75 MCG tablet         No discharge procedures on file.    Time Spent on discharge is  39 mins in patient examination, evaluation, counseling as well as medication reconciliation, prescriptions for required medications, discharge plan and follow up.    Electronically signed by   Clark Olsen MD  4/22/2024  1:48 PM      Thank you Liyah Li MD for the opportunity to be involved in this patient's care.

## 2024-04-22 NOTE — DISCHARGE INSTR - DIET

## 2024-04-22 NOTE — PLAN OF CARE
Problem: Discharge Planning  Goal: Discharge to home or other facility with appropriate resources  4/22/2024 1230 by Keren Whitfield RN  Outcome: Progressing  Flowsheets (Taken 4/22/2024 0800)  Discharge to home or other facility with appropriate resources: Identify barriers to discharge with patient and caregiver  4/22/2024 0314 by Juan Ibarra RN  Outcome: Progressing  Flowsheets (Taken 4/21/2024 2000)  Discharge to home or other facility with appropriate resources:   Identify barriers to discharge with patient and caregiver   Arrange for needed discharge resources and transportation as appropriate   Identify discharge learning needs (meds, wound care, etc)     Problem: Safety - Adult  Goal: Free from fall injury  4/22/2024 1230 by Keren Whitfield RN  Outcome: Progressing  Flowsheets (Taken 4/22/2024 0800)  Free From Fall Injury: Instruct family/caregiver on patient safety  4/22/2024 0314 by Juan Ibarra RN  Outcome: Progressing  Flowsheets (Taken 4/21/2024 2243)  Free From Fall Injury: Instruct family/caregiver on patient safety     Problem: ABCDS Injury Assessment  Goal: Absence of physical injury  4/22/2024 1230 by Keren Whitfield RN  Outcome: Progressing  Flowsheets (Taken 4/22/2024 0800)  Absence of Physical Injury: Implement safety measures based on patient assessment  4/22/2024 0314 by Juan Ibarra RN  Outcome: Progressing  Flowsheets (Taken 4/21/2024 2243)  Absence of Physical Injury: Implement safety measures based on patient assessment

## 2024-04-22 NOTE — PLAN OF CARE
Problem: Discharge Planning  Goal: Discharge to home or other facility with appropriate resources  Outcome: Progressing  Flowsheets (Taken 4/21/2024 2000)  Discharge to home or other facility with appropriate resources:   Identify barriers to discharge with patient and caregiver   Arrange for needed discharge resources and transportation as appropriate   Identify discharge learning needs (meds, wound care, etc)     Problem: Safety - Adult  Goal: Free from fall injury  Outcome: Progressing  Flowsheets (Taken 4/21/2024 2243)  Free From Fall Injury: Instruct family/caregiver on patient safety     Problem: ABCDS Injury Assessment  Goal: Absence of physical injury  Outcome: Progressing  Flowsheets (Taken 4/21/2024 2243)  Absence of Physical Injury: Implement safety measures based on patient assessment

## 2024-04-22 NOTE — PROGRESS NOTES
Pt stable for discharge. IVs removed, bleeding stopped. Pt & daughter in law verbalize understand of all discharge instructions. All belongings carried down by family. Pt wheeled down to car to be driven home by daughter in law.   
[facility]  Office: [phone]  Norris Galvan DO, Anant Gallo DO, Jose Butts DO, Bruno García DO, Juan J Gonzales MD, Elham Rodrigues MD, Cornell Ambrose MD, Anna Montiel MD,  David Vidal MD, Eve Bynum MD, Lesley Love MD,  Adia Bartlett DO, Lalito De Leon MD, Pato Mccoy MD, Gaurang Galvan DO, Tamy Ardon MD,  Petros Dodd DO, Leia Moyer MD, Jackie Beltrán MD, Elysia Goodman MD, Nikhil Mcguire MD,  Rodney Knight MD, Cassidy Guadarrama MD, Rigoberto Duran MD, Pily Kent MD, Kade Douglass MD, Clark Olsen MD, Ezequiel Riley DO, Polo Bui DO, Ronni Nobles MD,  Benson Dasilva MD, Shirley Waterhouse, CNP,  Dyana Luke CNP, Sin Miguel, CNP,  Liliana Jones, DNP, Mesha Ibrahim, CNP, Marjorie Portillo, CNP, Pam Adhikari, CNP, Lina James, CNP, Celina Ahuja, PA-C, Tomasa Varela PA-C, Rita Barrientos, CNP, Caren Garcia, CNP, Cindy Cox, CNP, Obdulia Green, CNP, Erin Flanagan, CNP, Preethi Monterroso, CNS, Calista Corral, CNP, Yesenia Farmer CNP, Tracy Schwab, CNP         [facility]   IN-PATIENT SERVICE   [facility]    Progress Note    4/22/2024    10:28 AM    Name:   Marissa Campo  MRN:     [id number]     Acct:      [id number]   Room:   347/347-01   Day:  3  Admit Date:  4/19/2024  3:41 PM    PCP:   Liyah Hartman MD  Code Status:  Full Code    Subjective:     C/C:   Chief Complaint   Patient presents with    Anxiety    Shortness of Breath     Pt arrives with co anxiety. Pt is here with DIL who states they have been battling anxiety for approx 1 mth. Pt has tried zoloft and is now going to try a new medication. Pt states she feels like she is having trouble getting a deep breath     Interval History Status: improved.     Patient seen and examined at bedside this morning. No acute events overnight. Patient completed Abx for UTI yesterday. Patient wanting to go home. Discussed that we would await her ECHO results 
[facility]  Office: [phone]  Norris Galvan DO, Anant Gallo DO, Jose Butts DO, Bruno García DO, Juan J Gonzales MD, Elham Rodrigues MD, Cornell Ambrose MD, Anna Montiel MD,  David Vidal MD, Eve Bynum MD, Lesley Love MD,  Adia Bartlett DO, Lalito De Leon MD, Pato Mccoy MD, Gaurang Galvan DO, Tamy Ardon MD,  Petros Dodd DO, Leia Moyer MD, Jackie Beltrán MD, Elysia Goodman MD, Nikhil Mcguire MD,  Rodney Knight MD, Cassidy Guadarrama MD, Rigoberto Duran MD, Pily Kent MD, Kade Douglass MD, Clark Olsen MD, Ezequiel Riley DO, Polo Bui DO, Ronni Nobles MD,  Benson Dasilva MD, Shirley Waterhouse, CNP,  Dyana Luke CNP, Sin Miguel, CNP,  Liliana Jones, DNP, Mesha Ibrahim, CNP, Marjorie Portillo, CNP, Pam Adhikari, CNP, Lina James, CNP, Celina Ahuja, PA-C, Tomasa Varela PA-C, Rita Barrientos, CNP, Caren Garcia, CNP, Cindy Cxo, CNP, Obdulia Green, CNP, Erin Flanagan, CNP, Preehti Monterroso, CNS, Calista Corral, CNP, Yesenia Farmer CNP, Tracy Schwab, CNP         [facility]   IN-PATIENT SERVICE   [facility]    Progress Note    4/21/2024    10:38 AM    Name:   Marissa Campo  MRN:     [id number]     Acct:      [id number]   Room:   347/347-01   Day:  2  Admit Date:  4/19/2024  3:41 PM    PCP:   Liyah Hartman MD  Code Status:  Full Code    Subjective:     C/C:   Chief Complaint   Patient presents with    Anxiety    Shortness of Breath     Pt arrives with co anxiety. Pt is here with DIL who states they have been battling anxiety for approx 1 mth. Pt has tried zoloft and is now going to try a new medication. Pt states she feels like she is having trouble getting a deep breath     Interval History Status: improved.     Patient seen and examined at bedside this morning.  No acute events overnight.  Patient resting in bed on room air.  Awaiting echo tomorrow.  Currently on heparin gtt.  Patient denies having any 
IntraVENous, PRN, Waterhouse, Shirley Ann, APRN - CNP    heparin (porcine) injection 1,400 Units, 30 Units/kg, IntraVENous, PRN, Waterhouse, Shirley Ann, APRN - CNP, 1,400 Units at 04/20/24 1942    heparin 25,000 units in dextrose 5% 250 mL (premix) infusion, 5-30 Units/kg/hr, IntraVENous, Continuous, Waterhouse, Shirley Ann, APRN - CNP, Last Rate: 7.7 mL/hr at 04/21/24 0843, 16 Units/kg/hr at 04/21/24 0843    Allergies   Allergen Reactions    Ticagrelor Shortness Of Breath    Clopidogrel Hives and Rash    Simvastatin Nausea And Vomiting       Past Medical History:    Past Medical History:   Diagnosis Date    Arthritis     CAD (coronary artery disease)     Hyperlipidemia     Hypertension     Hypothyroidism          Past Surgical History:  History reviewed. No pertinent surgical history.      Home Medications:    No outpatient medications have been marked as taking for the 4/19/24 encounter (Hospital Encounter).        Allergies:  Ticagrelor, Clopidogrel, and Simvastatin    Social History:    Social History     Socioeconomic History    Marital status:      Spouse name: None    Number of children: None    Years of education: None    Highest education level: None   Tobacco Use    Smoking status: Never    Smokeless tobacco: Never   Substance and Sexual Activity    Alcohol use: Never    Sexual activity: Never     Social Determinants of Health     Food Insecurity: No Food Insecurity (4/20/2024)    Hunger Vital Sign     Worried About Running Out of Food in the Last Year: Never true     Ran Out of Food in the Last Year: Never true   Transportation Needs: No Transportation Needs (4/20/2024)    PRAPARE - Transportation     Lack of Transportation (Medical): No     Lack of Transportation (Non-Medical): No   Housing Stability: Low Risk  (4/20/2024)    Housing Stability Vital Sign     Unable to Pay for Housing in the Last Year: No     Number of Places Lived in the Last Year: 1     Unstable Housing in the Last Year: No    
      APTT:  Recent Labs     04/20/24  1849 04/21/24  0146   APTT 44.2* 66.4*       CARDIAC ENZYMES:  Recent Labs     04/19/24  1653 04/19/24  1855   TROPHS 21* 20*       FASTING LIPID PANEL:  Lab Results   Component Value Date/Time    HDL 60 01/08/2024 01:51 PM    LDLCALC 95 01/08/2024 01:51 PM    TRIG 81 01/08/2024 01:51 PM     LIVER PROFILE:No results for input(s): \"AST\", \"ALT\", \"LABALBU\" in the last 72 hours.      IMPRESSION:      New P. AFib AFL- rate controlled - now NSR  Minimally elevated trop- not suggestive of ACS  Sys mur concern for significant AS  Known CAD with LDX 99% with collaterals and RCA 80% based on cath in 2018  HTN  DL  UTI  CEA     RECOMMENDATIONS:  On heparin drip  Will need DOAC on d/c  Keep off effient  Continue ASA  Plan for eliquis and ASA on d/c   On Toprol  Needs echo Monday due to significant murmur  Will follow with Dr Mckeon on d/c.     Discussed with patient and nursing.    Thank you for allowing me to participate in the care of this patient, please do not hesitate to call if you have any questions.    Reza Poon DO, FACC, RPVI, [facility]  [facility]  [phone]

## 2024-04-23 LAB
MICROORGANISM SPEC CULT: ABNORMAL
MICROORGANISM SPEC CULT: ABNORMAL
SPECIMEN DESCRIPTION: ABNORMAL

## 2025-06-01 ENCOUNTER — HOSPITAL ENCOUNTER (EMERGENCY)
Age: 89
Discharge: HOME | End: 2025-06-01
Attending: STUDENT IN AN ORGANIZED HEALTH CARE EDUCATION/TRAINING PROGRAM
Payer: COMMERCIAL

## 2025-06-01 ENCOUNTER — APPOINTMENT (OUTPATIENT)
Dept: GENERAL RADIOLOGY | Age: 89
End: 2025-06-01
Payer: COMMERCIAL

## 2025-06-01 ENCOUNTER — APPOINTMENT (OUTPATIENT)
Dept: CT IMAGING | Age: 89
End: 2025-06-01
Payer: COMMERCIAL

## 2025-06-01 VITALS
RESPIRATION RATE: 14 BRPM | DIASTOLIC BLOOD PRESSURE: 65 MMHG | HEART RATE: 62 BPM | WEIGHT: 105 LBS | HEIGHT: 59 IN | TEMPERATURE: 98.6 F | BODY MASS INDEX: 21.17 KG/M2 | SYSTOLIC BLOOD PRESSURE: 153 MMHG | OXYGEN SATURATION: 100 %

## 2025-06-01 DIAGNOSIS — Z03.83: Primary | ICD-10-CM

## 2025-06-01 LAB
ANION GAP SERPL CALCULATED.3IONS-SCNC: 12 MMOL/L (ref 9–16)
BACTERIA URNS QL MICRO: ABNORMAL
BASOPHILS # BLD: 0 K/UL (ref 0–0.2)
BASOPHILS NFR BLD: 1 % (ref 0–2)
BILIRUB UR QL STRIP: NEGATIVE
BNP SERPL-MCNC: 4016 PG/ML (ref 0–450)
BUN SERPL-MCNC: 16 MG/DL (ref 8–23)
CALCIUM SERPL-MCNC: 9 MG/DL (ref 8.6–10.4)
CHARACTER UR: ABNORMAL
CHLORIDE SERPL-SCNC: 100 MMOL/L (ref 98–107)
CLARITY UR: CLEAR
CO2 SERPL-SCNC: 23 MMOL/L (ref 20–31)
COLOR UR: YELLOW
CREAT SERPL-MCNC: 0.9 MG/DL (ref 0.6–0.9)
EOSINOPHIL # BLD: 0.1 K/UL (ref 0–0.4)
EOSINOPHILS RELATIVE PERCENT: 2 % (ref 1–4)
EPI CELLS #/AREA URNS HPF: ABNORMAL /HPF (ref 0–5)
ERYTHROCYTE [DISTWIDTH] IN BLOOD BY AUTOMATED COUNT: 14.3 % (ref 12.5–15.4)
GFR, ESTIMATED: 60 ML/MIN/1.73M2
GLUCOSE SERPL-MCNC: 89 MG/DL (ref 74–99)
GLUCOSE UR STRIP-MCNC: NEGATIVE MG/DL
HCT VFR BLD AUTO: 36 % (ref 36–46)
HGB BLD-MCNC: 11.7 G/DL (ref 12–16)
HGB UR QL STRIP.AUTO: ABNORMAL
KETONES UR STRIP-MCNC: NEGATIVE MG/DL
LEUKOCYTE ESTERASE UR QL STRIP: ABNORMAL
LYMPHOCYTES NFR BLD: 1.2 K/UL (ref 1–4.8)
LYMPHOCYTES RELATIVE PERCENT: 25 % (ref 24–44)
MCH RBC QN AUTO: 31 PG (ref 26–34)
MCHC RBC AUTO-ENTMCNC: 32.5 G/DL (ref 31–37)
MCV RBC AUTO: 95.4 FL (ref 80–100)
MONOCYTES NFR BLD: 0.4 K/UL (ref 0.1–1.2)
MONOCYTES NFR BLD: 9 % (ref 2–11)
NEUTROPHILS NFR BLD: 63 % (ref 36–66)
NEUTS SEG NFR BLD: 3 K/UL (ref 1.8–7.7)
NITRITE UR QL STRIP: NEGATIVE
PH UR STRIP: 6 [PH] (ref 5–8)
PLATELET # BLD AUTO: 234 K/UL (ref 140–450)
PMV BLD AUTO: 7.2 FL (ref 6–12)
POTASSIUM SERPL-SCNC: 4.5 MMOL/L (ref 3.7–5.3)
PROT UR STRIP-MCNC: NEGATIVE MG/DL
RBC # BLD AUTO: 3.77 M/UL (ref 4–5.2)
RBC #/AREA URNS HPF: ABNORMAL /HPF (ref 0–2)
SODIUM SERPL-SCNC: 135 MMOL/L (ref 136–145)
SP GR UR STRIP: <1.005 (ref 1–1.03)
TROPONIN I SERPL HS-MCNC: 16 NG/L (ref 0–14)
TROPONIN I SERPL HS-MCNC: 17 NG/L (ref 0–14)
UROBILINOGEN UR STRIP-ACNC: NORMAL EU/DL (ref 0–1)
WBC #/AREA URNS HPF: ABNORMAL /HPF (ref 0–5)
WBC OTHER # BLD: 4.7 K/UL (ref 3.5–11)

## 2025-06-01 PROCEDURE — 99285 EMERGENCY DEPT VISIT HI MDM: CPT

## 2025-06-01 PROCEDURE — 83880 ASSAY OF NATRIURETIC PEPTIDE: CPT

## 2025-06-01 PROCEDURE — 93005 ELECTROCARDIOGRAM TRACING: CPT | Performed by: STUDENT IN AN ORGANIZED HEALTH CARE EDUCATION/TRAINING PROGRAM

## 2025-06-01 PROCEDURE — 81001 URINALYSIS AUTO W/SCOPE: CPT

## 2025-06-01 PROCEDURE — 71045 X-RAY EXAM CHEST 1 VIEW: CPT

## 2025-06-01 PROCEDURE — 87086 URINE CULTURE/COLONY COUNT: CPT

## 2025-06-01 PROCEDURE — 36415 COLL VENOUS BLD VENIPUNCTURE: CPT

## 2025-06-01 PROCEDURE — 71275 CT ANGIOGRAPHY CHEST: CPT

## 2025-06-01 PROCEDURE — 84484 ASSAY OF TROPONIN QUANT: CPT

## 2025-06-01 PROCEDURE — 6360000004 HC RX CONTRAST MEDICATION: Performed by: STUDENT IN AN ORGANIZED HEALTH CARE EDUCATION/TRAINING PROGRAM

## 2025-06-01 PROCEDURE — 80048 BASIC METABOLIC PNL TOTAL CA: CPT

## 2025-06-01 PROCEDURE — 85025 COMPLETE CBC W/AUTO DIFF WBC: CPT

## 2025-06-01 PROCEDURE — 2500000003 HC RX 250 WO HCPCS: Performed by: STUDENT IN AN ORGANIZED HEALTH CARE EDUCATION/TRAINING PROGRAM

## 2025-06-01 RX ORDER — IOPAMIDOL 755 MG/ML
100 INJECTION, SOLUTION INTRAVASCULAR
Status: COMPLETED | OUTPATIENT
Start: 2025-06-01 | End: 2025-06-01

## 2025-06-01 RX ORDER — 0.9 % SODIUM CHLORIDE 0.9 %
80 INTRAVENOUS SOLUTION INTRAVENOUS ONCE
Status: DISCONTINUED | OUTPATIENT
Start: 2025-06-01 | End: 2025-06-01 | Stop reason: HOSPADM

## 2025-06-01 RX ORDER — SODIUM CHLORIDE 0.9 % (FLUSH) 0.9 %
10 SYRINGE (ML) INJECTION ONCE
Status: COMPLETED | OUTPATIENT
Start: 2025-06-01 | End: 2025-06-01

## 2025-06-01 RX ADMIN — SODIUM CHLORIDE, PRESERVATIVE FREE 10 ML: 5 INJECTION INTRAVENOUS at 20:25

## 2025-06-01 RX ADMIN — IOPAMIDOL 100 ML: 755 INJECTION, SOLUTION INTRAVENOUS at 20:25

## 2025-06-01 RX ADMIN — Medication 100 ML: at 20:26

## 2025-06-01 ASSESSMENT — PAIN - FUNCTIONAL ASSESSMENT: PAIN_FUNCTIONAL_ASSESSMENT: NONE - DENIES PAIN

## 2025-06-01 NOTE — ED PROVIDER NOTES
Value Ref Range    WBC, UA 5 TO 10 0 - 5 /HPF    RBC, UA 0 TO 2 0 - 2 /HPF    Epithelial Cells, UA 2 TO 5 0 - 5 /HPF    Bacteria, UA FEW (A) None    Other Observations UA (A) NOT REQ.     Utilizing a urinalysis as the only screening method to exclude a potential uropathogen can be unreliable in many patient populations.  Rapid screening tests are less sensitive than culture and if UTI is a clinical possibility, culture should be considered despite a negative urinalysis.     EKG 12 Lead   Result Value Ref Range    Ventricular Rate 65 BPM    Atrial Rate 65 BPM    P-R Interval 162 ms    QRS Duration 70 ms    Q-T Interval 398 ms    QTc Calculation (Bazett) 413 ms    P Axis 64 degrees    R Axis -17 degrees    T Axis 22 degrees         CTA CHEST W CONTRAST   Final Result   1.  No acute abnormality identified in the chest.  No pulmonary embolism   visualized.      2.  Ascending aortic 4.3 cm aneurysm.      3.  Cardiomegaly.         XR CHEST PORTABLE   Final Result   1. Ascending aortic enlargement, nonspecific. CTA of the chest recommended for better characterization.   2. Right shoulder moderate-to-severe degenerative changes with glenohumeral joint determinate change and calcific densities at the right glenoid region.                      DEPARTMENT VITAL SIGNS:     Vitals:    06/01/25 1747 06/01/25 1800 06/01/25 1815 06/01/25 1845   BP:  (!) 169/91  (!) 153/65   Pulse: 73 66 82 62   Resp:  15 16 14   Temp:       TempSrc:       SpO2:  100% 90% 100%   Weight:       Height:         BP: (!) 153/65, Temp: 98.6 °F (37 °C), Pulse: 62, Respirations: 14     DISPOSITION NOTE:   Patient with return precautions, PCP follow-up.    FINAL IMPRESSION:     1. Suspected condition related to home physiologic monitoring device not found after observation        DISPOSITION:   Decision To Discharge    PATIENT REFERRED TO:   Liyah Hartman MD  [address], [address]  [place] [address]  [phone]          [facility]

## 2025-06-02 LAB
EKG ATRIAL RATE: 65 BPM
EKG P AXIS: 64 DEGREES
EKG P-R INTERVAL: 162 MS
EKG Q-T INTERVAL: 398 MS
EKG QRS DURATION: 70 MS
EKG QTC CALCULATION (BAZETT): 413 MS
EKG R AXIS: -17 DEGREES
EKG T AXIS: 22 DEGREES
EKG VENTRICULAR RATE: 65 BPM

## 2025-06-02 PROCEDURE — 93010 ELECTROCARDIOGRAM REPORT: CPT | Performed by: INTERNAL MEDICINE

## 2025-06-02 NOTE — DISCHARGE INSTRUCTIONS
You were seen in the emergency department today for concern for bradycardia and hypotension your work-up showed unremarkable cardiac labs, your CTA showed that your aorta is dilated but stable.  You did not have any further episodes of bradycardia or hypotension throughout your stay.    Please follow-up with your PCP within the next 2 to 3 days      PLEASE RETURN TO THE EMERGENCY DEPARTMENT IMMEDIATELY for worsening symptoms, or if you develop any concerning symptoms such as: high fever not relieved by acetaminophen (Tylenol) and/or ibuprofen (Motrin), chills, shortness of breath, chest pain, persistent nausea and/or vomiting, numbness, weakness or tingling in the arms or legs or change in color of the extremities, changes in mental status, persistent headache, blurry vision, unable to follow up with your physician, or other any other care or concern.

## 2025-06-03 LAB
MICROORGANISM SPEC CULT: NORMAL
SPECIMEN DESCRIPTION: NORMAL

## 2025-06-22 ENCOUNTER — HOSPITAL ENCOUNTER (EMERGENCY)
Age: 89
Discharge: HOME | End: 2025-06-22
Attending: EMERGENCY MEDICINE
Payer: COMMERCIAL

## 2025-06-22 ENCOUNTER — APPOINTMENT (OUTPATIENT)
Dept: CT IMAGING | Age: 89
End: 2025-06-22
Payer: COMMERCIAL

## 2025-06-22 ENCOUNTER — APPOINTMENT (OUTPATIENT)
Dept: GENERAL RADIOLOGY | Age: 89
End: 2025-06-22
Payer: COMMERCIAL

## 2025-06-22 VITALS
BODY MASS INDEX: 21.77 KG/M2 | TEMPERATURE: 99.1 F | OXYGEN SATURATION: 97 % | RESPIRATION RATE: 20 BRPM | HEIGHT: 59 IN | HEART RATE: 74 BPM | SYSTOLIC BLOOD PRESSURE: 122 MMHG | WEIGHT: 108 LBS | DIASTOLIC BLOOD PRESSURE: 60 MMHG

## 2025-06-22 DIAGNOSIS — S02.2XXA CLOSED FRACTURE OF NASAL BONE, INITIAL ENCOUNTER: Primary | ICD-10-CM

## 2025-06-22 PROCEDURE — 99284 EMERGENCY DEPT VISIT MOD MDM: CPT | Performed by: EMERGENCY MEDICINE

## 2025-06-22 PROCEDURE — 70450 CT HEAD/BRAIN W/O DYE: CPT

## 2025-06-22 PROCEDURE — 6370000000 HC RX 637 (ALT 250 FOR IP)

## 2025-06-22 PROCEDURE — 73110 X-RAY EXAM OF WRIST: CPT

## 2025-06-22 PROCEDURE — 72125 CT NECK SPINE W/O DYE: CPT

## 2025-06-22 PROCEDURE — 70486 CT MAXILLOFACIAL W/O DYE: CPT

## 2025-06-22 RX ORDER — HYDROCODONE BITARTRATE AND ACETAMINOPHEN 5; 325 MG/1; MG/1
1 TABLET ORAL EVERY 4 HOURS PRN
Qty: 18 TABLET | Refills: 0 | Status: SHIPPED | OUTPATIENT
Start: 2025-06-22 | End: 2025-06-25

## 2025-06-22 RX ORDER — HYDROCODONE BITARTRATE AND ACETAMINOPHEN 5; 325 MG/1; MG/1
0.5 TABLET ORAL ONCE
Status: COMPLETED | OUTPATIENT
Start: 2025-06-22 | End: 2025-06-22

## 2025-06-22 RX ORDER — LIDOCAINE 30 MG/G
1 CREAM TOPICAL 2 TIMES DAILY
Qty: 28.5 G | Refills: 0 | Status: SHIPPED | OUTPATIENT
Start: 2025-06-22

## 2025-06-22 RX ORDER — FENTANYL CITRATE 50 UG/ML
12.5 INJECTION, SOLUTION INTRAMUSCULAR; INTRAVENOUS ONCE
Status: DISCONTINUED | OUTPATIENT
Start: 2025-06-22 | End: 2025-06-22 | Stop reason: HOSPADM

## 2025-06-22 RX ADMIN — HYDROCODONE BITARTRATE AND ACETAMINOPHEN 0.5 TABLET: 5; 325 TABLET ORAL at 15:01

## 2025-06-22 ASSESSMENT — PAIN DESCRIPTION - LOCATION
LOCATION: WRIST
LOCATION: FACE;NOSE

## 2025-06-22 ASSESSMENT — PAIN SCALES - GENERAL
PAINLEVEL_OUTOF10: 10
PAINLEVEL_OUTOF10: 5

## 2025-06-22 ASSESSMENT — PAIN DESCRIPTION - DESCRIPTORS: DESCRIPTORS: BURNING

## 2025-06-22 ASSESSMENT — PAIN - FUNCTIONAL ASSESSMENT
PAIN_FUNCTIONAL_ASSESSMENT: 0-10
PAIN_FUNCTIONAL_ASSESSMENT: 0-10

## 2025-06-22 ASSESSMENT — PAIN DESCRIPTION - ORIENTATION: ORIENTATION: RIGHT;LEFT

## 2025-06-22 NOTE — ED PROVIDER NOTES
[facility] Emergency Department  [address]  [place] [address]  Phone: [phone]  Fax: [phone]      Patient Name:  Marissa Campo  Medical Record Number:  [id number]  YOB: 1933  Date of Service:  6/22/2025  Primary Care Physician:  Liyah Hartman MD      CHIEF COMPLAINT:       Chief Complaint   Patient presents with    Fall     Pt fell getting out of bed around 1030. She states \"she flew\" she hit her head but not sure on what. Unsure of LOC       HISTORY OF PRESENT ILLNESS:    Marissa Campo is a 89 y.o. female who presents with the complaint of mechanical trip and fall while getting out of bed around 10:30 AM.  She reports she fell that she tripped and fell \"flying\" forward striking her head against a cedar chest at the end of the bed.  She reports she did not lose consciousnes she denies any neck pain denies any head pain most of her pain is in the nose.     She has an impressive amount of swelling of the left eyelid, bridge of the nose with ecchymosis.  She is able to move the eye without complications denies any eye pain or visual deficits.  She also describes a burning sensation of the bilateral wrists and hands that is new since the fall.  There is no obvious deformities or injuries to the wrist she has significant pain even with just light touch of the bilateral wrists.  She has full range of motion and strength is intact.    CURRENT MEDICATIONS:      Discharge Medication List as of 6/22/2025  4:17 PM        CONTINUE these medications which have NOT CHANGED    Details   levothyroxine (SYNTHROID) 75 MCG tablet Take 1 tablet by mouth daily, Disp-30 tablet, R-3Normal      apixaban (ELIQUIS) 5 MG TABS tablet Take 1 tablet by mouth 2 times daily, Disp-180 tablet, R-1Normal      aspirin 81 MG EC tablet Take 1 tablet by mouth dailyHistorical Med      atorvastatin (LIPITOR) 40 MG tablet TAKE 1 TABLET BY MOUTH EVERY DAYHistorical Med      metoprolol succinate (TOPROL

## 2025-06-22 NOTE — ED PROVIDER NOTES
Emergency Department     Faculty Attestation    I performed a history and physical examination of the patient and discussed management with the mid level provider. I reviewed the mid level provider's note and agree with the documented findings and plan of care. Any areas of disagreement are noted on the chart. I was personally present for the key portions of any procedures. I have documented in the chart those procedures where I was not present during the key portions. I have reviewed the emergency nurses triage note. I agree with the chief complaint, past medical history, past surgical history, allergies, medications, social and family history as documented unless otherwise noted below. Documentation of the HPI, Physical Exam and Medical Decision Making performed by medical students or scribes is based on my personal performance of the HPI, PE and MDM. For Physician Assistant/ Nurse Practitioner cases/documentation I have personally evaluated this patient and have completed at least one if not all key elements of the E/M (history, physical exam, and MDM). Additional findings are as noted.      Primary Care Physician:  Liyah Hartman MD    CHIEF COMPLAINT       Chief Complaint   Patient presents with    Fall     Pt fell getting out of bed around 1030. She states \"she flew\" she hit her head but not sure on what. Unsure of LOC       RECENT VITALS:   Temp: 99.1 °F (37.3 °C),  Pulse: 77, Respirations: 20, BP: (!) 166/90    LABS:  Labs Reviewed - No data to display      PERTINENT ATTENDING PHYSICIAN COMMENTS:    Patient presents after a fall at home she apparently fell into her bedroom region over the nightstand she is got facial trauma unknown loss of consciousness family says she did have some repetitive speech immediately what they saw her she had obvious bruising to the face and orbital region but says her vision is fine she is able to move her arms and legs without a problem she complains of a burning

## 2025-06-22 NOTE — PROGRESS NOTES
Spiritual Health History and Assessment/Progress Note  [facility]    (P) Spiritual/Emotional Needs, Initial Encounter, (P) Trauma, (P) Life Adjustments, Adjustment to illness, Anticipatory Grief,      Name: Marissa Campo MRN: [id number]    Age: 89 y.o.     Sex: female   Language: English   Jain: Synagogue   <principal problem not specified>     Date: 6/22/2025            Total Time Calculated: (P) 20 min              Spiritual Assessment began in [facility] EMERGENCY DEPARTMENT        Referral/Consult From: (P) Rounding   Encounter Overview/Reason: (P) Spiritual/Emotional Needs, Initial Encounter  Service Provided For: (P) Patient, Family       provided support and care to Pt. In ER. Pt. Was awake and alert. Family was present during visit.  provided active listening and comfort and encouragement. Pt. Was open to conversation with family and friendly. Will follow up if needed.    Traci, Belief, Meaning:   Patient has beliefs or practices that help with coping during difficult times  Family/Friends have beliefs or practices that help with coping during difficult times      Importance and Influence:  Patient has spiritual/personal beliefs that influence decisions regarding their health  Family/Friends have spiritual/personal beliefs that influence decisions regarding the patient's health    Community:  Patient feels well-supported. Support system includes: Children  Family/Friends feel well-supported. Support system includes: Parent/s and Extended family    Assessment and Plan of Care:     Patient Interventions include: Facilitated expression of thoughts and feelings and Explored spiritual coping/struggle/distress  Family/Friends Interventions include: Facilitated expression of thoughts and feelings and Explored spiritual coping/struggle/distress    Patient Plan of Care: Spiritual Care available upon further referral  Family/Friends Plan of Care: Spiritual Care available

## 2025-06-22 NOTE — ED NOTES
Family declined pt receiving iv access.  [facility] NP notified, new orders received.

## 2025-06-22 NOTE — DISCHARGE INSTRUCTIONS
PLEASE RETURN TO THE EMERGENCY DEPARTMENT IMMEDIATELY if your symptoms worsen in anyway or in 8-12 hours if not improved for re-evaluation.  You should immediately return to the ER for symptoms such as new or worsening pain, fever, visual or hearing changes, stiff neck, rash, a feeling of passing out, chest pain, shortness of breath, persistent nausea and/or vomiting, numbness or weakness to the arms or legs, coolness or color change of the arms or legs.  You should avoid contact sports or activities where you might hit your head for a minimum of 1 week.      Take your medication as indicated and prescribed.  If you are given an antibiotic then, make sure you get the prescription filled and take the antibiotics until finished.  Ice the face several times per day over the next week.    Regarding the fracture here is a list of things to avoid:  Blowing the nose  Sticking things up the nose  Plugging the nose when sneezing,     Please understand that at this time there is no evidence for a more serious underlying process, but that early in the process of an illness or injury, an emergency department workup can be falsely reassuring.  You should contact your family doctor within the next 24 hours for a follow up appointment    THANK YOU!!!    From [facility] and [facility]    On behalf of the Emergency Department staff at [facility], I would like to thank you for giving us the opportunity to address your health care needs and concerns.    We hope that during your visit, our service was delivered in a professional and caring manner. Please keep [facility] in mind as we walk with you down the path to your own personal wellness.     Please expect an automated text message or email from us so we can ask a few questions about your health and progress. Based on your answers, a clinician may call you back to offer help and instructions.    Please understand that early in the process of an illness or